# Patient Record
Sex: MALE | Race: OTHER | URBAN - METROPOLITAN AREA
[De-identification: names, ages, dates, MRNs, and addresses within clinical notes are randomized per-mention and may not be internally consistent; named-entity substitution may affect disease eponyms.]

---

## 2021-06-14 ENCOUNTER — OUTPATIENT (OUTPATIENT)
Dept: OUTPATIENT SERVICES | Facility: HOSPITAL | Age: 36
LOS: 1 days | Discharge: HOME | End: 2021-06-14

## 2021-06-15 DIAGNOSIS — Z11.59 ENCOUNTER FOR SCREENING FOR OTHER VIRAL DISEASES: ICD-10-CM

## 2023-06-18 ENCOUNTER — EMERGENCY (EMERGENCY)
Facility: HOSPITAL | Age: 38
LOS: 1 days | Discharge: HOME | End: 2023-06-18
Admitting: EMERGENCY MEDICINE
Payer: MEDICAID

## 2023-06-18 VITALS
SYSTOLIC BLOOD PRESSURE: 152 MMHG | TEMPERATURE: 98 F | HEART RATE: 80 BPM | RESPIRATION RATE: 15 BRPM | OXYGEN SATURATION: 100 % | DIASTOLIC BLOOD PRESSURE: 102 MMHG

## 2023-06-18 VITALS
DIASTOLIC BLOOD PRESSURE: 85 MMHG | TEMPERATURE: 98 F | OXYGEN SATURATION: 98 % | HEART RATE: 76 BPM | SYSTOLIC BLOOD PRESSURE: 146 MMHG | RESPIRATION RATE: 17 BRPM

## 2023-06-18 DIAGNOSIS — S01.511A LACERATION WITHOUT FOREIGN BODY OF LIP, INITIAL ENCOUNTER: ICD-10-CM
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
## 2023-06-18 DIAGNOSIS — R51.9 HEADACHE, UNSPECIFIED: ICD-10-CM
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
## 2023-06-18 DIAGNOSIS — S03.03XA DISLOCATION OF JAW, BILATERAL, INITIAL ENCOUNTER: ICD-10-CM

## 2023-06-18 PROCEDURE — 99284 EMERGENCY DEPT VISIT MOD MDM: CPT | Mod: 25

## 2023-06-18 PROCEDURE — 70486 CT MAXILLOFACIAL W/O DYE: CPT | Mod: 26,59

## 2023-06-18 PROCEDURE — 21480 CLTX TMPRMAND DISLC 1ST/SBSQ: CPT | Mod: 50

## 2023-06-18 PROCEDURE — 12013 RPR F/E/E/N/L/M 2.6-5.0 CM: CPT | Mod: 59

## 2023-06-18 PROCEDURE — 70450 CT HEAD/BRAIN W/O DYE: CPT | Mod: 26

## 2023-06-18 PROCEDURE — 99053 MED SERV 10PM-8AM 24 HR FAC: CPT

## 2023-06-18 RX ORDER — TETANUS TOXOID, REDUCED DIPHTHERIA TOXOID AND ACELLULAR PERTUSSIS VACCINE, ADSORBED 5; 2.5; 8; 8; 2.5 [IU]/.5ML; [IU]/.5ML; UG/.5ML; UG/.5ML; UG/.5ML
0.5 SUSPENSION INTRAMUSCULAR ONCE
Refills: 0 | Status: COMPLETED | OUTPATIENT
Start: 2023-06-18 | End: 2023-06-18

## 2023-06-18 RX ORDER — CHLORHEXIDINE GLUCONATE 213 G/1000ML
15 SOLUTION TOPICAL
Qty: 100 | Refills: 0
Start: 2023-06-18

## 2023-06-18 RX ORDER — ACETAMINOPHEN 500 MG
975 TABLET ORAL ONCE
Refills: 0 | Status: COMPLETED | OUTPATIENT
Start: 2023-06-18 | End: 2023-06-18

## 2023-06-18 RX ADMIN — TETANUS TOXOID, REDUCED DIPHTHERIA TOXOID AND ACELLULAR PERTUSSIS VACCINE, ADSORBED 0.5 MILLILITER(S): 5; 2.5; 8; 8; 2.5 SUSPENSION INTRAMUSCULAR at 04:58

## 2023-06-18 RX ADMIN — Medication 500 MILLIGRAM(S): at 07:08

## 2023-06-18 RX ADMIN — Medication 1 TABLET(S): at 07:07

## 2023-06-18 RX ADMIN — Medication 975 MILLIGRAM(S): at 04:58
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
## 2023-06-18 NOTE — ED ADULT NURSE NOTE - NSFALLUNIVINTERV_ED_ALL_ED
Bed/Stretcher in lowest position, wheels locked, appropriate side rails in place/Call bell, personal items and telephone in reach/Instruct patient to call for assistance before getting out of bed/chair/stretcher/Non-slip footwear applied when patient is off stretcher/[place] to call system/Physically safe environment - no spills, clutter or unnecessary equipment/Purposeful proactive rounding/Room/bathroom lighting operational, light cord in reach

## 2023-06-18 NOTE — ED PROVIDER NOTE - INTERNATIONAL TRAVEL
Patient due for the following    Hepatitis C Screening     HIV Screening     COVID-19 Vaccine (3 - Booster for Moderna series)    Colorectal Cancer Screening       Immunizations: reviewed and updated  Care Everywhere: triggered  Care Teams: up to date  Outreach: completed     No
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
## 2023-06-18 NOTE — ED PROVIDER NOTE - PATIENT PORTAL LINK FT
You can access the FollowMyHealth Patient Portal offered by [facility] by registering at the following website: [email or web address]. By joining [employer]’s FollowMyHealth portal, you will also be able to view your health information using other applications (apps) compatible with our system.

## 2023-06-18 NOTE — ED PROVIDER NOTE - NSFOLLOWUPINSTRUCTIONS_ED_ALL_ED_FT
You have a laceration to your lip/mouth. Dissolvable sutures have been placed.     •Eat a soft diet.  •Avoid hot foods and hot liquids for a few days as told by your health care provider.  •Rinse your mouth after eating.      Wound care     •It is normal to have a white or gray patch over your wound while it heals.    •Check your wound every day for signs of infection. Check for:  •Redness, swelling, or pain.  •Fluid or blood.  •Warmth.  •Pus or a bad smell.    •Gently gargle and rinse your mouth 4 to 6 times a day. Spit out the liquid. Do not swallow.  •Use the rinse solution recommended by your health care provider, which may include:  •Antibacterial rinse (chlorhexidine).  •Saline rinse.  • Do not poke the sutures with your tongue. Doing that can loosen them.    If you have a lip laceration:    •Keep the wound completely dry for the first 24 hours, or as told by your health care provider. After that time, you may shower or bathe. Do not soak in water until after the sutures have been removed.    •If you were given a bandage (dressing), you should change it at least once a day, or as told by your health care provider. You should also change it if it becomes wet or dirty.    •Clean the wound once each day, or as told by your health care provider.      General instructions   Using a toothbrush to brush the front and back sides of the teeth.   •Maintain regular oral hygiene. Gently brush your teeth with a soft, nylon-bristled toothbrush 2 times per day.  • Do not use any products that contain nicotine or tobacco. These products include cigarettes, chewing tobacco, and vaping devices, such as e-cigarettes. If you need help quitting, ask your health care provider.    •Keep all follow-up visits. This is important.        Contact a health care provider if:  •Your pain is not controlled with medicine.    •You have:  •A fever or chills.  •Redness, swelling, or pain on your wound, and it is getting worse.  •Fresh bleeding or pus coming from your wound.  •Swollen or tender glands in your throat.    Get help right away if:  •The edges of your wound break open.  •Your face or the area under your jaw becomes swollen.  •You have trouble breathing or swallowing.  •Temperature of >100.4F that does not go away with tylenol or motrin     Jaw Dislocation  Side view of the head showing the jawbones. One close-up shows a normal joint. Another close-up shows a dislocated joint.  A jaw dislocation is when the joint where your jawbones meet moves out of place (gets dislocated). This can hurt a lot. You may have trouble moving your mouth and jaw.    What are the causes?  A hard, direct hit or injury to the jaw.  Opening your mouth too widely. This can happen when:  You eat, yawn, laugh, or vomit.  You have dental work done.  You get medicine by mouth to make you fall asleep (general anesthetic).  You have a seizure.  You have a kind of side effect of certain medicines.  What increases the risk?  You have displaced your jaw before.  You play contact sports.  Your jaw is loose or can move more than normal for most people.  You have a condition that makes your jaw ligaments loose. These are tissues that connect bones to each other.  What are the signs or symptoms?  Symptoms may include:  Very bad pain, especially when you try to move your jaw.  Not being able to move your jaw all the way or at all.  Not being able to close your mouth all the way or at all.  Feeling that your teeth do not line up as they normally do when you bite.  Drooling.  Trouble speaking or swallowing.  How is this treated?  Having your doctor move your jaw back into place (manual reduction). You may get pain medicine before your doctor moves your jaw.  Medicines.  A neck brace.  A bandage wrapped around your head and jaw.  Follow these instructions at home:  Eating and drinking    Follow instructions from your doctor about what you cannot eat or drink while your jaw heals. You may be asked to:  Eat soft foods.  Eat liquid foods.  Eat food that has been cut into small pieces.  For the first several days, make sure to take only small, careful bites. Try not to open your jaw widely when you eat.  If you have a brace or bandage:    Wear the brace or bandage as told by your doctor. Take it off only as told by your doctor.  Check the skin around the brace or bandage every day. Tell your doctor if you see problems.  Loosen the brace or bandage if your jaw feels numb.  Keep the brace or bandage clean.  If the brace or bandage is not waterproof:  Do not let it get wet.  Cover it with a watertight covering when you take a bath or shower.  Managing pain, stiffness, and swelling    Bag of ice on a towel on the skin.  If told, put ice on the injured area. To do this:  If you have a brace or bandage, take it off as told by your doctor.  Put ice in a plastic bag.  Place a towel between your skin and the bag.  Leave the ice on for 20 minutes, 2–3 times a day.  Take off the ice if your skin turns bright red. This is very important. If you cannot feel pain, heat, or cold, you have a greater risk of damage to the area.  Activity    Do not open your mouth widely until your doctor says that you can.  Rest your jaw as told by your doctor.  Avoid activities that are like the one that caused your injury.  Try not to open your jaw widely when you laugh or yawn.  When you sneeze or yawn, put a hand under your jaw.  General instructions    Take over-the-counter and prescription medicines only as told by your doctor.  Do not take baths, swim, or use a hot tub. Ask your doctor about taking showers or sponge baths.  Do not smoke or use any products that contain nicotine or tobacco. If you need help quitting, ask your doctor.  Keep all follow-up visits.  Contact a doctor if:  Your pain gets worse.  Medicines do not help your pain.  Get help right away if:  Your jaw moves out of place again.  It is hard to breathe, eat, or swallow.  These symptoms may be an emergency. Get help right away. Call your local emergency services (911 in the [place]  Do not wait to see if the symptoms will go away.  Do not drive yourself to the hospital.  Summary  A jaw dislocation is when the joint where your jawbones meet moves out of place (gets dislocated).  When your jaw moves out of place, you can have a lot of pain. You may have trouble moving your mouth and jaw.  Your doctor may move your jaw back into place (manual reduction). You may get pain medicine before your jaw gets moved.  This information is not intended to replace advice given to you by your health care provider. Make sure you discuss any questions you have with your health care provider.

## 2023-06-18 NOTE — ED PROCEDURE NOTE - CPROC ED POST PROC CARE GUIDE1
Verbal/written post procedure instructions were given to patient/caregiver./Keep the cast/splint/dressing clean and dry.
Verbal/written post procedure instructions were given to patient/caregiver./Instructed patient/caregiver to follow-up with primary care physician./Instructed patient/caregiver regarding signs and symptoms of infection./Keep the cast/splint/dressing clean and dry.
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
## 2023-06-18 NOTE — ED PROVIDER NOTE - PROVIDER TOKENS
PROVIDER:[TOKEN:[[id number]]],PROVIDER:[TOKEN:[[id number]]],PROVIDER:[TOKEN:[[id number]]]

## 2023-06-18 NOTE — ED PROCEDURE NOTE - CPROC ED POST RADIOGRAPHY1
TMJ reduction, able to fully open and close mouth without malocclusion or malalignment/post procedure radiography not performed

## 2023-06-18 NOTE — ED PROVIDER NOTE - CARE PROVIDER_API CALL
Moreno Barajas  Plastic Surgery  [address] [place] [address]  Phone: [phone]  Fax: [phone]  Follow Up Time:     Efra El  Oral/Maxillofacial Surgery  [address], [address]  [place] [address]  Phone: [phone]  Fax: [phone]  Follow Up Time:     Garrett Moore  Orthopaedic Surgery  [address]  [place] [address]  Phone: [phone]  Fax: [phone]  Follow Up Time:
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
## 2024-11-06 NOTE — ED ADULT NURSE NOTE - CHPI ED NUR SYMPTOMS POS
Orders:    Lipid Panel with Triglycerides/HDL-Cholesterol; Future    CBC (Includes Diff/Plt) (Refl); Future    Lipid panel; Future    Albumin / creatinine urine ratio; Future    Comprehensive metabolic panel; Future    Hemoglobin A1C; Future     ABRASION/FACIAL PAIN/PRESSURE/LACERATION/PAIN